# Patient Record
Sex: FEMALE | Race: WHITE | URBAN - METROPOLITAN AREA
[De-identification: names, ages, dates, MRNs, and addresses within clinical notes are randomized per-mention and may not be internally consistent; named-entity substitution may affect disease eponyms.]

---

## 2017-12-28 ENCOUNTER — NEW PATIENT (OUTPATIENT)
Dept: URBAN - METROPOLITAN AREA CLINIC 10 | Facility: CLINIC | Age: 77
End: 2017-12-28
Payer: COMMERCIAL

## 2017-12-28 DIAGNOSIS — H25.13 AGE-RELATED NUCLEAR CATARACT, BILATERAL: Primary | ICD-10-CM

## 2017-12-28 PROCEDURE — 92004 COMPRE OPH EXAM NEW PT 1/>: CPT | Performed by: OPTOMETRIST

## 2017-12-28 PROCEDURE — 92015 DETERMINE REFRACTIVE STATE: CPT | Performed by: OPTOMETRIST

## 2017-12-28 ASSESSMENT — INTRAOCULAR PRESSURE
OD: 21
OS: 20

## 2017-12-28 ASSESSMENT — VISUAL ACUITY
OD: 20/20
OS: 20/40

## 2017-12-28 ASSESSMENT — KERATOMETRY
OD: 42.50
OS: 43.13

## 2019-09-06 ENCOUNTER — FOLLOW UP ESTABLISHED (OUTPATIENT)
Dept: URBAN - METROPOLITAN AREA CLINIC 10 | Facility: CLINIC | Age: 79
End: 2019-09-06
Payer: MEDICARE

## 2019-09-06 DIAGNOSIS — H25.813 COMBINED FORMS OF AGE-RELATED CATARACT, BILATERAL: ICD-10-CM

## 2019-09-06 DIAGNOSIS — H43.812 VITREOUS DEGENERATION, LEFT EYE: Primary | ICD-10-CM

## 2019-09-06 DIAGNOSIS — H40.013 OPEN ANGLE WITH BORDERLINE FINDINGS, LOW RISK, BILATERAL: ICD-10-CM

## 2019-09-06 DIAGNOSIS — H04.123 TEAR FILM INSUFFICIENCY OF BILATERAL LACRIMAL GLANDS: ICD-10-CM

## 2019-09-06 PROCEDURE — 92134 CPTRZ OPH DX IMG PST SGM RTA: CPT | Performed by: OPTOMETRIST

## 2019-09-06 PROCEDURE — 92014 COMPRE OPH EXAM EST PT 1/>: CPT | Performed by: OPTOMETRIST

## 2019-09-06 PROCEDURE — 76514 ECHO EXAM OF EYE THICKNESS: CPT | Performed by: OPTOMETRIST

## 2019-09-06 ASSESSMENT — VISUAL ACUITY
OS: 20/30
OD: 20/25

## 2019-09-06 ASSESSMENT — INTRAOCULAR PRESSURE
OS: 22
OD: 22

## 2019-09-06 ASSESSMENT — KERATOMETRY
OD: 42.25
OS: 42.38

## 2021-08-24 ENCOUNTER — OFFICE VISIT (OUTPATIENT)
Dept: URBAN - METROPOLITAN AREA CLINIC 34 | Facility: CLINIC | Age: 81
End: 2021-08-24
Payer: MEDICARE

## 2021-08-24 DIAGNOSIS — H02.831 DERMATOCHALASIS OF RIGHT UPPER EYELID: ICD-10-CM

## 2021-08-24 DIAGNOSIS — Z41.1 ENCOUNTER FOR COSMETIC SURGERY: ICD-10-CM

## 2021-08-24 DIAGNOSIS — H02.834 DERMATOCHALASIS OF LEFT UPPER EYELID: Primary | ICD-10-CM

## 2021-08-24 PROCEDURE — 92082 INTERMEDIATE VISUAL FIELD XM: CPT | Performed by: OPHTHALMOLOGY

## 2021-08-24 PROCEDURE — 92285 EXTERNAL OCULAR PHOTOGRAPHY: CPT | Performed by: OPHTHALMOLOGY

## 2021-08-24 PROCEDURE — 99204 OFFICE O/P NEW MOD 45 MIN: CPT | Performed by: OPHTHALMOLOGY

## 2021-08-24 RX ORDER — NEOMYCIN SULFATE, POLYMYXIN B SULFATE AND DEXAMETHASONE 3.5; 10000; 1 MG/G; [USP'U]/G; MG/G
OINTMENT OPHTHALMIC
Qty: 1 | Refills: 1 | Status: ACTIVE
Start: 2021-08-24

## 2021-08-24 NOTE — IMPRESSION/PLAN
Impression: Encounter for cosmetic surgery: Z41.1. 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 After review of these options, we selected cosmetic pretrichial/lateral brow lift as the best option given the patient's anatomy, desires, and means. We discussed this method seeks to minimize the lateral hooding that cannot be addressed with the eyelid crease incision. This technique will not elevate the central brow. Risks of this technique include numbness (typically transient), hair loss (typically transient), paralysis/weakness of the facial nerve (temporal branch) with weakness of brow elevation.

## 2024-11-13 ENCOUNTER — APPOINTMENT (RX ONLY)
Dept: URBAN - METROPOLITAN AREA CLINIC 144 | Facility: CLINIC | Age: 84
Setting detail: DERMATOLOGY
End: 2024-11-13

## 2024-11-13 DIAGNOSIS — L73.8 OTHER SPECIFIED FOLLICULAR DISORDERS: ICD-10-CM

## 2024-11-13 DIAGNOSIS — L71.8 OTHER ROSACEA: ICD-10-CM | Status: INADEQUATELY CONTROLLED

## 2024-11-13 DIAGNOSIS — L82.1 OTHER SEBORRHEIC KERATOSIS: ICD-10-CM

## 2024-11-13 PROCEDURE — ? COUNSELING

## 2024-11-13 PROCEDURE — 99204 OFFICE O/P NEW MOD 45 MIN: CPT

## 2024-11-13 PROCEDURE — ? PRESCRIPTION

## 2024-11-13 PROCEDURE — ? TREATMENT REGIMEN

## 2024-11-13 RX ORDER — METRONIDAZOLE 7.5 MG/G
CREAM TOPICAL
Qty: 45 | Refills: 3 | Status: ERX | COMMUNITY
Start: 2024-11-13

## 2024-11-13 RX ADMIN — METRONIDAZOLE: 7.5 CREAM TOPICAL at 00:00

## 2024-11-13 ASSESSMENT — LOCATION SIMPLE DESCRIPTION DERM
LOCATION SIMPLE: NOSE
LOCATION SIMPLE: RIGHT LOWER BACK
LOCATION SIMPLE: NOSE
LOCATION SIMPLE: ABDOMEN
LOCATION SIMPLE: LEFT CHEEK

## 2024-11-13 ASSESSMENT — LOCATION ZONE DERM
LOCATION ZONE: NOSE
LOCATION ZONE: FACE
LOCATION ZONE: NOSE
LOCATION ZONE: TRUNK

## 2024-11-13 ASSESSMENT — LOCATION DETAILED DESCRIPTION DERM
LOCATION DETAILED: LEFT LATERAL ABDOMEN
LOCATION DETAILED: RIGHT INFERIOR MEDIAL MIDBACK
LOCATION DETAILED: LEFT INFERIOR CENTRAL MALAR CHEEK
LOCATION DETAILED: NASAL SUPRATIP
LOCATION DETAILED: NASAL SUPRATIP

## 2024-11-13 NOTE — PROCEDURE: REASSURANCE
Detail Level: Zone
Include Location In Plan?: No
Additional Note: Some of the lesions are getting traumatized by clothing per patient. We discussed option for cryo. Pt declines today, but states she might schedule separate appt in future.
Detail Level: Detailed

## 2024-11-13 NOTE — PROCEDURE: TREATMENT REGIMEN
Detail Level: Zone
Initiate Treatment: metronidazole 0.75 % topical cream \\nQuantity: 45.0 g  Days Supply: 30\\nSig: Apply thin layer to face one to two times a day as needed for red bumps until resolved.